# Patient Record
Sex: MALE | Race: WHITE | Employment: FULL TIME | URBAN - METROPOLITAN AREA
[De-identification: names, ages, dates, MRNs, and addresses within clinical notes are randomized per-mention and may not be internally consistent; named-entity substitution may affect disease eponyms.]

---

## 2021-08-14 ENCOUNTER — HOSPITAL ENCOUNTER (EMERGENCY)
Age: 26
Discharge: HOME | End: 2021-08-15
Attending: EMERGENCY MEDICINE

## 2021-08-14 VITALS
WEIGHT: 195 LBS | HEART RATE: 88 BPM | BODY MASS INDEX: 25.73 KG/M2 | TEMPERATURE: 99.1 F | RESPIRATION RATE: 16 BRPM | OXYGEN SATURATION: 98 % | SYSTOLIC BLOOD PRESSURE: 141 MMHG | DIASTOLIC BLOOD PRESSURE: 87 MMHG

## 2021-08-14 DIAGNOSIS — L23.7 POISON IVY DERMATITIS: Primary | ICD-10-CM

## 2021-08-14 PROCEDURE — 99281 EMR DPT VST MAYX REQ PHY/QHP: CPT

## 2021-08-14 RX ORDER — PROPARACAINE HYDROCHLORIDE 5 MG/ML
1 SOLUTION/ DROPS OPHTHALMIC
Status: DISCONTINUED | OUTPATIENT
Start: 2021-08-14 | End: 2021-08-15 | Stop reason: HOSPADM

## 2021-08-14 NOTE — Clinical Note
[facility] EMERGENCY DEPT  [id number] [address] [phone]    Work/School Note    Date: 8/14/2021    To Whom It May concern:      Caro Haas II was seen and treated today in the emergency room by the following provider(s):  Attending Provider: Alyssa Hyatt MD  Physician Assistant: TOD Del Angel. Shwetha Lopez is excused from work/school on 08/15/21. He is clear to return to work/school on 08/16/21.         Sincerely,          TOD Ruelas

## 2021-08-15 RX ORDER — PREDNISONE 5 MG/1
TABLET ORAL
Qty: 21 TABLET | Refills: 0 | Status: SHIPPED | OUTPATIENT
Start: 2021-08-15

## 2021-08-15 RX ORDER — DIPHENHYDRAMINE HCL 25 MG
50 CAPSULE ORAL
Qty: 20 CAPSULE | Refills: 0 | Status: SHIPPED | OUTPATIENT
Start: 2021-08-15

## 2021-08-15 NOTE — ED TRIAGE NOTES
C/o rash to bilateral arms x 1 day. Pt also has a \"edith\" on his face he is concerned about as well.

## 2021-08-15 NOTE — ED PROVIDER NOTES
EMERGENCY DEPARTMENT HISTORY AND PHYSICAL EXAM      Date: 8/14/2021  Patient Name: Adan Wells II    History of Presenting Illness     Chief Complaint   Patient presents with    Rash       History Provided By: Patient    HPI: Licia Saint, 30 y.o. male no significant PMHx presents ambulatory to the ED. Patient reports pruritic rash to arms and legs for the past few days. Patient reports noticing a \"edith\" to his face upon waking this morning. Denies eye pain, blurred vision, photophobia. Denies wearing contact lenses or glasses. 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 Denies previous history of poison ivy dermatitis. Pt has not taken anything for symptoms. There are no other complaints, changes, or physical findings at this time. PCP: Other, MD Austen    No current facility-administered medications on file prior to encounter. Current Outpatient Medications on File Prior to Encounter   Medication Sig Dispense Refill    HYDROcodone-acetaminophen (NORCO) 5-325 mg per tablet Take 1 Tab by mouth every four (4) hours as needed for Pain. 20 Tab 0       Past History     Past Medical History:  No past medical history on file. Past Surgical History:  No past surgical history on file. Family History:  Family History   Problem Relation Age of Onset    Cancer Neg Hx     Diabetes Neg Hx     Heart Disease Neg Hx     Hypertension Neg Hx     Stroke Neg Hx        Social History:  Social History     Tobacco Use    Smoking status: Never Smoker   Substance Use Topics    Alcohol use: No    Drug use: No       Allergies:  No Known Allergies      Review of Systems   Review of Systems   Constitutional: Negative for chills and fever. HENT: Negative for facial swelling. Eyes: Negative for photophobia and visual disturbance. Respiratory: Negative for shortness of breath. Cardiovascular: Negative for chest pain.    Gastrointestinal: Negative for abdominal pain, nausea and vomiting. Genitourinary: Negative for flank pain. Skin: Positive for rash. Negative for color change, pallor and wound. Neurological: Negative for dizziness, weakness, light-headedness and headaches. All other systems reviewed and are negative. Physical Exam   Physical Exam  Vitals and nursing note reviewed. Constitutional:       General: He is not in acute distress. Appearance: He is well-developed. Comments: Pt in NAD   HENT:      Head: Normocephalic and atraumatic. Eyes:      General: Lids are everted, no foreign bodies appreciated. Conjunctiva/sclera: Conjunctivae normal.      Pupils:      Right eye: No corneal abrasion or fluorescein uptake. Comments: PERRL  EOM intact   Cardiovascular:      Rate and Rhythm: Normal rate and regular rhythm. Heart sounds: Normal heart sounds. Pulmonary:      Effort: Pulmonary effort is normal. No respiratory distress. Breath sounds: Normal breath sounds. Abdominal:      General: Bowel sounds are normal.      Palpations: Abdomen is soft. Tenderness: There is no abdominal tenderness. Musculoskeletal:         General: Normal range of motion. Skin:     General: Skin is warm. Findings: No rash. Comments: Erythematous vesicles to arms and legs b/l with yellow crusting. No signs of secondary infection. No pustules. Neurological:      Mental Status: He is alert and oriented to person, place, and time. Cranial Nerves: No cranial nerve deficit. Psychiatric:         Behavior: Behavior normal.         Diagnostic Study Results     Labs -   No results found for this or any previous visit (from the past 12 hour(s)). Radiologic Studies -   No orders to display     CT Results  (Last 48 hours)    None        CXR Results  (Last 48 hours)    None          Medical Decision Making   I am the first provider for this patient.     I reviewed the vital signs, available nursing notes, past medical history, past surgical history, family history and social history. Vital Signs-Reviewed the patient's vital signs. Patient Vitals for the past 12 hrs:   Temp Pulse Resp BP SpO2   08/14/21 2234 99.1 °F (37.3 °C) 88 16 (!) 141/87 98 %       Records Reviewed: Nursing Notes and Old Medical Records    Provider Notes (Medical Decision Making):   DDx: Shira Lawrence    31 yo M who presents with rash to arms and legs x \"a few days\" with \"edith\" to face upon waking this am.  Rash consistent with poison ivy. No signs of secondary infection. Fluorescein stain reveals no vesicles to cornea. Will prescribe benadryl and steroids. At time of discharge, pt non-toxic appearing in NAD. Pt stable for prompt outpatient follow-up with PCP 1 to 2 days. Patient given strict instructions to return if symptoms worsen. ED Course:   Initial assessment performed. The patients presenting problems have been discussed, and they are in agreement with the care plan formulated and outlined with them. I have encouraged them to ask questions as they arise throughout their visit. Disposition:  12:03 AM  Discussed dx and treatment plan. Discussed importance of PCP follow up. All questions answered. Pt voiced they understood. Return if sx worsen. PLAN:  1. Current Discharge Medication List      START taking these medications    Details   predniSONE (STERAPRED) 5 mg dose pack See administration instruction per 5mg dose pack  Qty: 21 Tablet, Refills: 0  Start date: 8/15/2021      diphenhydrAMINE (BenadryL) 25 mg capsule Take 2 Capsules by mouth every six (6) hours as needed for Skin Irritation. Qty: 20 Capsule, Refills: 0  Start date: 8/15/2021           2.    Follow-up Information     Follow up With Specialties Details Why [address] [facility]  Schedule an appointment as soon as possible for a visit in 1 day  [address]  [facility] [address] [place] [address] [address]    [address] [facility] EMERGENCY DEPT Emergency Medicine As needed, If symptoms worsen [address]  Schedule an appointment as soon as possible for a visit in 1 day  [address]  [phone]        Return to ED if worse     Diagnosis     Clinical Impression:   1. Poison ivy dermatitis        Attestations:    TOD Stanley    Please note that this dictation was completed with Satago, the computer voice recognition software. Quite often unanticipated grammatical, syntax, homophones, and other interpretive errors are inadvertently transcribed by the computer software. Please disregard these errors. Please excuse any errors that have escaped final proofreading. Thank you.